# Patient Record
Sex: MALE | Race: WHITE | NOT HISPANIC OR LATINO | Employment: UNEMPLOYED | URBAN - METROPOLITAN AREA
[De-identification: names, ages, dates, MRNs, and addresses within clinical notes are randomized per-mention and may not be internally consistent; named-entity substitution may affect disease eponyms.]

---
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
## 2025-03-26 NOTE — DISCHARGE INSTRUCTIONS
Cool compress, gentle stretching, Naprosyn.  Return for increased pain, increased shortness of breath, high fever, persistent vomiting or any other concerns.  
Grossly Intact

## 2025-04-13 ENCOUNTER — APPOINTMENT (OUTPATIENT)
Dept: GENERAL RADIOLOGY | Facility: HOSPITAL | Age: 20
End: 2025-04-13
Payer: COMMERCIAL

## 2025-04-13 ENCOUNTER — APPOINTMENT (OUTPATIENT)
Dept: MRI IMAGING | Facility: HOSPITAL | Age: 20
End: 2025-04-13
Payer: COMMERCIAL

## 2025-04-13 ENCOUNTER — HOSPITAL ENCOUNTER (EMERGENCY)
Facility: HOSPITAL | Age: 20
Discharge: HOME | End: 2025-04-13
Admitting: EMERGENCY MEDICINE
Payer: COMMERCIAL

## 2025-04-13 VITALS
HEIGHT: 73 IN | TEMPERATURE: 97.9 F | BODY MASS INDEX: 23.19 KG/M2 | OXYGEN SATURATION: 100 % | WEIGHT: 175 LBS | HEART RATE: 76 BPM | RESPIRATION RATE: 22 BRPM | DIASTOLIC BLOOD PRESSURE: 71 MMHG | SYSTOLIC BLOOD PRESSURE: 110 MMHG

## 2025-04-13 DIAGNOSIS — M54.50 ACUTE LOW BACK PAIN, UNSPECIFIED BACK PAIN LATERALITY, UNSPECIFIED WHETHER SCIATICA PRESENT: ICD-10-CM

## 2025-04-13 DIAGNOSIS — M51.369 BULGING LUMBAR DISC: Primary | ICD-10-CM

## 2025-04-13 LAB
HOLD SPECIMEN: NORMAL
HOLD SPECIMEN: NORMAL
WHOLE BLOOD HOLD COAG: NORMAL
WHOLE BLOOD HOLD SPECIMEN: NORMAL

## 2025-04-13 PROCEDURE — 99284 EMERGENCY DEPT VISIT MOD MDM: CPT

## 2025-04-13 PROCEDURE — 72110 X-RAY EXAM L-2 SPINE 4/>VWS: CPT

## 2025-04-13 PROCEDURE — 96375 TX/PRO/DX INJ NEW DRUG ADDON: CPT

## 2025-04-13 PROCEDURE — 96374 THER/PROPH/DIAG INJ IV PUSH: CPT

## 2025-04-13 PROCEDURE — 72148 MRI LUMBAR SPINE W/O DYE: CPT

## 2025-04-13 PROCEDURE — 25010000002 MORPHINE PER 10 MG: Performed by: NURSE PRACTITIONER

## 2025-04-13 PROCEDURE — 25010000002 KETOROLAC TROMETHAMINE PER 15 MG: Performed by: NURSE PRACTITIONER

## 2025-04-13 PROCEDURE — 25010000002 ONDANSETRON PER 1 MG: Performed by: NURSE PRACTITIONER

## 2025-04-13 PROCEDURE — 25010000002 DEXAMETHASONE PER 1 MG: Performed by: NURSE PRACTITIONER

## 2025-04-13 PROCEDURE — 36415 COLL VENOUS BLD VENIPUNCTURE: CPT

## 2025-04-13 PROCEDURE — 25010000002 ORPHENADRINE CITRATE PER 60 MG: Performed by: NURSE PRACTITIONER

## 2025-04-13 RX ORDER — KETOROLAC TROMETHAMINE 30 MG/ML
15 INJECTION, SOLUTION INTRAMUSCULAR; INTRAVENOUS ONCE
Status: COMPLETED | OUTPATIENT
Start: 2025-04-13 | End: 2025-04-13

## 2025-04-13 RX ORDER — DEXAMETHASONE SODIUM PHOSPHATE 4 MG/ML
4 INJECTION, SOLUTION INTRA-ARTICULAR; INTRALESIONAL; INTRAMUSCULAR; INTRAVENOUS; SOFT TISSUE ONCE
Status: COMPLETED | OUTPATIENT
Start: 2025-04-13 | End: 2025-04-13

## 2025-04-13 RX ORDER — METHYLPREDNISOLONE 4 MG/1
TABLET ORAL
Qty: 21 TABLET | Refills: 0 | Status: SHIPPED | OUTPATIENT
Start: 2025-04-13

## 2025-04-13 RX ORDER — ONDANSETRON 2 MG/ML
4 INJECTION INTRAMUSCULAR; INTRAVENOUS ONCE
Status: COMPLETED | OUTPATIENT
Start: 2025-04-13 | End: 2025-04-13

## 2025-04-13 RX ORDER — HYDROCODONE BITARTRATE AND ACETAMINOPHEN 5; 325 MG/1; MG/1
.5-1 TABLET ORAL EVERY 6 HOURS PRN
Qty: 12 TABLET | Refills: 0 | Status: SHIPPED | OUTPATIENT
Start: 2025-04-13

## 2025-04-13 RX ORDER — ORPHENADRINE CITRATE 30 MG/ML
30 INJECTION INTRAMUSCULAR; INTRAVENOUS ONCE
Status: COMPLETED | OUTPATIENT
Start: 2025-04-13 | End: 2025-04-13

## 2025-04-13 RX ADMIN — KETOROLAC TROMETHAMINE 15 MG: 30 INJECTION, SOLUTION INTRAMUSCULAR; INTRAVENOUS at 13:17

## 2025-04-13 RX ADMIN — DEXAMETHASONE SODIUM PHOSPHATE 4 MG: 4 INJECTION, SOLUTION INTRAMUSCULAR; INTRAVENOUS at 14:20

## 2025-04-13 RX ADMIN — ONDANSETRON 4 MG: 2 INJECTION, SOLUTION INTRAMUSCULAR; INTRAVENOUS at 13:17

## 2025-04-13 RX ADMIN — MORPHINE SULFATE 4 MG: 4 INJECTION, SOLUTION INTRAMUSCULAR; INTRAVENOUS at 14:20

## 2025-04-13 RX ADMIN — ORPHENADRINE CITRATE 30 MG: 60 INJECTION INTRAMUSCULAR; INTRAVENOUS at 13:17

## 2025-04-13 NOTE — Clinical Note
[facility] EMERGENCY DEPARTMENT  [address] [place] [address]  Phone: [phone]    Andrei Bocanegra was seen and treated in our emergency department on 4/13/2025.  He may return to work on 04/15/2025.         Thank you for choosing [facility].    Ulisses Mitchell RN

## 2025-04-13 NOTE — ED PROVIDER NOTES
Subjective   History of Present Illness  Chief complaint: Back pain      Context: Patient is a 19-year-old male who presents with family with complaints of low back pain.  He states he has been seeing a chiropractor for the last several weeks for low back pain.  He states he was lifting a sheet of metal today and was turning to sit down when he had an acute pain in his low back.  He did not take any medications prior to arrival.  No saddle anesthesia bowel or bladder incontinence or retention.  No prior spine surgeries.   No history of chemo or radiation osteoporosis IV drug use or chronic steroid use.        PCP:         Review of Systems   Constitutional:  Negative for fever.       No past medical history on file.    Allergies   Allergen Reactions    Milk-Related Compounds Nausea Only       Past Surgical History:   Procedure Laterality Date    ADENOIDECTOMY      TONSILLECTOMY         Family History   Problem Relation Age of Onset    No Known Problems Mother     Diabetes Father     Hypertension Father        Social History     Socioeconomic History    Marital status: Single   Tobacco Use    Smoking status: Never     Passive exposure: Never    Smokeless tobacco: Never   Vaping Use    Vaping status: Former    Start date: 7/1/2019    Quit date: 10/15/2023    Substances: Nicotine, Flavoring    Devices: Disposable, Pre-filled pod    Passive vaping exposure: Yes   Substance and Sexual Activity    Alcohol use: Never    Drug use: Yes     Types: Marijuana     Comment: rarely    Sexual activity: Yes     Partners: Female     Birth control/protection: I.U.D.           Objective       Vital signs and traige nurse note reviewed.  Constitutional:  Awake, alert; well developed and well nourished.  Uncomfortable.  Family at bedside  HEENT:  Normocephalic,   Neck: Supple,    Cardiovascular: Regular rate and rhythm, normal S1-S2. .  Pulmonary: Respiratory effort regular, nonlabored; breath sounds CTA without wheezing or  rhonchi.  Abdomen: Soft, nontender, nondistended with normoactive bowel sounds; no rebound or guarding.    Musculoskeletal: Independent range of motion of all extremities, no palpable tenderness or edema.   Back:  Spine is midline and without midline tenderness on palpation of cervical, thoracic, and lumbar spine; paraspinal musculature is tender just right of midline around L4 and without soft tissue swelling, overlying ecchymosis or erythema. ROM reproduces pain,  Distal muscle strength is 5/5.  Neuro: Alert oriented x3, speech is clear and appropriate.   negative Babinski BLE, negative straight leg raise BLE, strong and equal dorsiflexion of bilateral great toes L4, L5, S1 motor sensory intact.      Procedures           ED Course  ED Course as of 04/13/25 1551   Sun Apr 13, 2025   1415 Patient reports unable to move to nursing staff. Mri will be obtained to eval for disc extrusion. Given decadron and morphine.  [JW]      ED Course User Index  [JW] Fany Queen, ISIAH                                             Labs Reviewed   RAINBOW DRAW    Narrative:     The following orders were created for panel order [place] Draw.  Procedure                               Abnormality         Status                     ---------                               -----------         ------                     Green Top (Gel)[[id number]]                                  Final result               Lavender Top[[id number]]                                     Final result               Gold Top - SST[[id number]]                                   Final result               Light Blue Top[[id number]]                                   Final result                 Please view results for these tests on the individual orders.   GREEN TOP   LAVENDER TOP   GOLD TOP - SST   LIGHT BLUE TOP     Medications   orphenadrine (NORFLEX) injection 30 mg (30 mg Intravenous Given 4/13/25 1317)   ondansetron (ZOFRAN) injection 4 mg (4 mg Intravenous Given  "4/13/25 1317)   ketorolac (TORADOL) injection 15 mg (15 mg Intravenous Given 4/13/25 1317)   dexAMETHasone (DECADRON) injection 4 mg (4 mg Intravenous Given 4/13/25 1420)   morphine injection 4 mg (4 mg Intravenous Given 4/13/25 1420)     MRI Lumbar Spine Without Contrast  Result Date: 4/13/2025  Impression: Isolated right central disc protrusion at L5-S1 resulting in mild thecal sac stenosis. No traversing nerve root impingement or significant neural foraminal stenosis. Otherwise no acute MRI findings. Electronically Signed: Darin Clemens MD  4/13/2025 3:07 PM EDT  Workstation ID: [id number]    XR Spine Lumbar Complete 4+VW  Result Date: 4/13/2025  Impression: No radiographic evidence of fracture or compression deformity. Electronically Signed: Rikki Verma  4/13/2025 1:41 PM EDT  Workstation ID: [id number]    Prior to Admission medications    Medication Sig Start Date End Date Taking? Authorizing Provider   ondansetron ODT (ZOFRAN-ODT) 8 MG disintegrating tablet Place 1 tablet on the tongue Every 8 (Eight) Hours As Needed for Nausea. 11/8/23   Deisi Garcia APRN               Medical Decision Making      /84   Pulse 69   Temp 97.9 °F (36.6 °C) (Oral)   Resp 21   Ht 185.4 cm (73\")   Wt 79.4 kg (175 lb)   SpO2 100%   BMI 23.09 kg/m²           Radiology interpretation:  X-rays reviewed and interpreted by deirdre: mild retrolisthesis  Further interpretation by radiologist as above  Lab interpretation:  Labs considered but not felt to be emergently warranted            Appropriate PPE worn during exam.  X-ray was obtained to evaluate for loss of disc space height compression fracture without acute findings.  He has no cauda equina or epidural abscess symptoms on exam.  He was given Toradol Zofran and Norflex.  States his pain is unchanged and was given Decadron and morphine.  MRI was obtained to evaluate for any surgical findings or disc extrusion.  He has no cauda equina findings.  He was noted to have " a bulging disc that does not appear to have any surgical indication or nerve impingement.  I discussed with Dr. Cerrato and patient will be discharged.  Inspect was reviewed and he will be discharged with short course of Norco and Medrol.  He was given a PT referral along with information to follow-up with spine specialist.  On reexam pain seems to have improved.        i discussed findings with patient and family who voices understanding of discharge instructions, signs and symptoms requiring return to ED; discharged improved and in stable condition with follow up for re-evaluation.  This document is intended for medical expert use only. Reading of this document by patients and/or patient's family without participating medical staff guidance may result in misinterpretation and unintended morbidity.  Any interpretation of such data is the responsibility of the patient and/or family member responsible for the patient in concert with their primary or specialist providers, not to be left for sources of online searches such as [employer], [employer] or similar queries. Relying on these approaches to knowledge may result in misinterpretation, misguided goals of care and even death should patients or family members try recommendations outside of the realm of professional medical care in a supervised inpatient environment.         Problems Addressed:  Acute low back pain, unspecified back pain laterality, unspecified whether sciatica present: complicated acute illness or injury  Bulging lumbar disc: complicated acute illness or injury    Amount and/or Complexity of Data Reviewed  Radiology: ordered.    Risk  Prescription drug management.        Final diagnoses:   Bulging lumbar disc   Acute low back pain, unspecified back pain laterality, unspecified whether sciatica present       ED Disposition  ED Disposition       ED Disposition   Discharge    Condition   Stable    Comment   --               Tabby Neumann, APRN  [address]  [address]  [place] [address]  [phone]          Alana Bustos, APRN  [address] [place] [address] [phone]    Schedule an appointment as soon as possible for a visit   As needed; back provider         Medication List        New Prescriptions      HYDROcodone-acetaminophen 5-325 MG per tablet  Commonly known as: NORCO  Take 0.5-1 tablets by mouth Every 6 (Six) Hours As Needed for Moderate Pain.     methylPREDNISolone 4 MG dose pack  Commonly known as: MEDROL  Take as directed on package instructions.               Where to Get Your Medications        These medications were sent to [id number] - [place], [place] - [address] - [phone]  - [phone] FX  [address] [place] [address]      Phone: [phone]   HYDROcodone-acetaminophen 5-325 MG per tablet  methylPREDNISolone 4 MG dose pack            Fany Queen, APRN  04/13/25 2722

## 2025-04-13 NOTE — DISCHARGE INSTRUCTIONS
Medications as directed  , muscle relaxation; light massage and range of motion exercises and improve the discomfort; back exercises 2-3 times daily; no heavy lifting, repeated bending or stooping for 3-5 days, gradually increase activity as tolerated by pain; return for numbness to the inner thigh, genitals or rectum, loss of control of your bowels or bladder, inability to urinate or worsening pain or symptoms. Follow up with primary care physician if no improvement in 2-3 days